# Patient Record
Sex: FEMALE | Race: WHITE | NOT HISPANIC OR LATINO | Employment: FULL TIME | URBAN - NONMETROPOLITAN AREA
[De-identification: names, ages, dates, MRNs, and addresses within clinical notes are randomized per-mention and may not be internally consistent; named-entity substitution may affect disease eponyms.]

---

## 2019-04-12 ENCOUNTER — CONVERSION ENCOUNTER (OUTPATIENT)
Dept: FAMILY MEDICINE CLINIC | Facility: CLINIC | Age: 39
End: 2019-04-12

## 2019-04-12 ENCOUNTER — OFFICE VISIT CONVERTED (OUTPATIENT)
Dept: FAMILY MEDICINE CLINIC | Facility: CLINIC | Age: 39
End: 2019-04-12
Attending: NURSE PRACTITIONER

## 2019-04-15 ENCOUNTER — HOSPITAL ENCOUNTER (OUTPATIENT)
Dept: GENERAL RADIOLOGY | Facility: HOSPITAL | Age: 39
Discharge: HOME | End: 2019-04-15
Attending: NURSE PRACTITIONER

## 2019-04-15 LAB
25(OH)D3 SERPL-MCNC: 35 NG/ML (ref 30–100)
ALBUMIN SERPL-MCNC: 4.6 G/DL (ref 3.5–5)
ALBUMIN/GLOB SERPL: 1.7 {RATIO} (ref 1.4–2.6)
ALP SERPL-CCNC: 63 U/L (ref 42–98)
ALT SERPL-CCNC: 30 U/L (ref 10–40)
ANION GAP SERPL CALC-SCNC: 16 MMOL/L (ref 8–19)
AST SERPL-CCNC: 21 U/L (ref 15–50)
BASOPHILS # BLD AUTO: 0.06 10*3/UL (ref 0–0.2)
BASOPHILS NFR BLD AUTO: 0.7 % (ref 0–3)
BILIRUB SERPL-MCNC: 0.26 MG/DL (ref 0.2–1.3)
BUN SERPL-MCNC: 12 MG/DL (ref 5–25)
BUN/CREAT SERPL: 16 {RATIO} (ref 6–20)
CALCIUM SERPL-MCNC: 9.1 MG/DL (ref 8.7–10.4)
CHLORIDE SERPL-SCNC: 103 MMOL/L (ref 99–111)
CHOLEST SERPL-MCNC: 174 MG/DL (ref 107–200)
CHOLEST/HDLC SERPL: 2.9 {RATIO} (ref 3–6)
CONV ABS IMM GRAN: 0.02 10*3/UL (ref 0–0.2)
CONV CO2: 25 MMOL/L (ref 22–32)
CONV IMMATURE GRAN: 0.2 % (ref 0–1.8)
CONV TOTAL PROTEIN: 7.3 G/DL (ref 6.3–8.2)
CREAT UR-MCNC: 0.75 MG/DL (ref 0.5–0.9)
DEPRECATED RDW RBC AUTO: 40.3 FL (ref 36.4–46.3)
EOSINOPHIL # BLD AUTO: 0.13 10*3/UL (ref 0–0.7)
EOSINOPHIL # BLD AUTO: 1.6 % (ref 0–7)
ERYTHROCYTE [DISTWIDTH] IN BLOOD BY AUTOMATED COUNT: 12.4 % (ref 11.7–14.4)
GFR SERPLBLD BASED ON 1.73 SQ M-ARVRAT: >60 ML/MIN/{1.73_M2}
GLOBULIN UR ELPH-MCNC: 2.7 G/DL (ref 2–3.5)
GLUCOSE SERPL-MCNC: 88 MG/DL (ref 65–99)
HBA1C MFR BLD: 13.1 G/DL (ref 12–16)
HCT VFR BLD AUTO: 41 % (ref 37–47)
HDLC SERPL-MCNC: 59 MG/DL (ref 40–60)
LDLC SERPL CALC-MCNC: 98 MG/DL (ref 70–100)
LYMPHOCYTES # BLD AUTO: 2.16 10*3/UL (ref 1–5)
MCH RBC QN AUTO: 28.4 PG (ref 27–31)
MCHC RBC AUTO-ENTMCNC: 32 G/DL (ref 33–37)
MCV RBC AUTO: 88.9 FL (ref 81–99)
MONOCYTES # BLD AUTO: 0.55 10*3/UL (ref 0.2–1.2)
MONOCYTES NFR BLD AUTO: 6.8 % (ref 3–10)
NEUTROPHILS # BLD AUTO: 5.11 10*3/UL (ref 2–8)
NEUTROPHILS NFR BLD AUTO: 63.8 % (ref 30–85)
NRBC CBCN: 0 % (ref 0–0.7)
OSMOLALITY SERPL CALC.SUM OF ELEC: 287 MOSM/KG (ref 273–304)
PLATELET # BLD AUTO: 265 10*3/UL (ref 130–400)
PMV BLD AUTO: 11.8 FL (ref 9.4–12.3)
POTASSIUM SERPL-SCNC: 4.5 MMOL/L (ref 3.5–5.3)
RBC # BLD AUTO: 4.61 10*6/UL (ref 4.2–5.4)
SODIUM SERPL-SCNC: 139 MMOL/L (ref 135–147)
TRIGL SERPL-MCNC: 86 MG/DL (ref 40–150)
TSH SERPL-ACNC: 2.95 M[IU]/L (ref 0.27–4.2)
VARIANT LYMPHS NFR BLD MANUAL: 26.9 % (ref 20–45)
VIT B12 SERPL-MCNC: 922 PG/ML (ref 211–911)
VLDLC SERPL-MCNC: 17 MG/DL (ref 5–37)
WBC # BLD AUTO: 8.03 10*3/UL (ref 4.8–10.8)

## 2020-01-03 ENCOUNTER — OFFICE VISIT CONVERTED (OUTPATIENT)
Dept: FAMILY MEDICINE CLINIC | Facility: CLINIC | Age: 40
End: 2020-01-03
Attending: NURSE PRACTITIONER

## 2021-05-15 VITALS
BODY MASS INDEX: 26.53 KG/M2 | TEMPERATURE: 97.5 F | HEART RATE: 72 BPM | DIASTOLIC BLOOD PRESSURE: 54 MMHG | RESPIRATION RATE: 16 BRPM | OXYGEN SATURATION: 99 % | SYSTOLIC BLOOD PRESSURE: 112 MMHG | WEIGHT: 159.25 LBS | HEIGHT: 65 IN

## 2021-05-15 VITALS
SYSTOLIC BLOOD PRESSURE: 115 MMHG | HEIGHT: 65 IN | TEMPERATURE: 97.2 F | WEIGHT: 180.25 LBS | BODY MASS INDEX: 30.03 KG/M2 | HEART RATE: 70 BPM | RESPIRATION RATE: 20 BRPM | DIASTOLIC BLOOD PRESSURE: 75 MMHG | OXYGEN SATURATION: 100 %

## 2021-10-08 ENCOUNTER — APPOINTMENT (OUTPATIENT)
Dept: WOMENS IMAGING | Facility: HOSPITAL | Age: 41
End: 2021-10-08

## 2021-10-08 PROCEDURE — 77063 BREAST TOMOSYNTHESIS BI: CPT | Performed by: RADIOLOGY

## 2021-10-08 PROCEDURE — 77067 SCR MAMMO BI INCL CAD: CPT | Performed by: RADIOLOGY

## 2022-09-23 ENCOUNTER — LAB (OUTPATIENT)
Dept: LAB | Facility: HOSPITAL | Age: 42
End: 2022-09-23

## 2022-09-23 ENCOUNTER — HOSPITAL ENCOUNTER (OUTPATIENT)
Dept: GENERAL RADIOLOGY | Facility: HOSPITAL | Age: 42
Discharge: HOME | End: 2022-09-23

## 2022-09-23 ENCOUNTER — OFFICE VISIT (OUTPATIENT)
Dept: FAMILY MEDICINE CLINIC | Facility: CLINIC | Age: 42
End: 2022-09-23

## 2022-09-23 VITALS
TEMPERATURE: 97.3 F | BODY MASS INDEX: 30.29 KG/M2 | HEIGHT: 65 IN | OXYGEN SATURATION: 100 % | SYSTOLIC BLOOD PRESSURE: 136 MMHG | HEART RATE: 80 BPM | DIASTOLIC BLOOD PRESSURE: 80 MMHG | WEIGHT: 181.8 LBS

## 2022-09-23 DIAGNOSIS — J30.9 ALLERGIC RHINITIS, UNSPECIFIED SEASONALITY, UNSPECIFIED TRIGGER: ICD-10-CM

## 2022-09-23 DIAGNOSIS — Z11.59 NEED FOR HEPATITIS C SCREENING TEST: ICD-10-CM

## 2022-09-23 DIAGNOSIS — R05.9 COUGH: ICD-10-CM

## 2022-09-23 DIAGNOSIS — Z13.220 SCREENING FOR LIPID DISORDERS: ICD-10-CM

## 2022-09-23 DIAGNOSIS — F32.A DEPRESSION, UNSPECIFIED DEPRESSION TYPE: ICD-10-CM

## 2022-09-23 DIAGNOSIS — Z11.59 NEED FOR HEPATITIS C SCREENING TEST: Primary | ICD-10-CM

## 2022-09-23 DIAGNOSIS — Z86.16 HISTORY OF COVID-19: ICD-10-CM

## 2022-09-23 DIAGNOSIS — Z13.29 SCREENING FOR THYROID DISORDER: ICD-10-CM

## 2022-09-23 LAB
ALBUMIN SERPL-MCNC: 4.5 G/DL (ref 3.5–5.2)
ALBUMIN/GLOB SERPL: 1.7 G/DL
ALP SERPL-CCNC: 70 U/L (ref 39–117)
ALT SERPL W P-5'-P-CCNC: 35 U/L (ref 1–33)
ANION GAP SERPL CALCULATED.3IONS-SCNC: 10.6 MMOL/L (ref 5–15)
AST SERPL-CCNC: 21 U/L (ref 1–32)
BASOPHILS # BLD AUTO: 0.08 10*3/MM3 (ref 0–0.2)
BASOPHILS NFR BLD AUTO: 0.7 % (ref 0–1.5)
BILIRUB SERPL-MCNC: 0.4 MG/DL (ref 0–1.2)
BUN SERPL-MCNC: 12 MG/DL (ref 6–20)
BUN/CREAT SERPL: 16.2 (ref 7–25)
CALCIUM SPEC-SCNC: 9 MG/DL (ref 8.6–10.5)
CHLORIDE SERPL-SCNC: 100 MMOL/L (ref 98–107)
CHOLEST SERPL-MCNC: 203 MG/DL (ref 0–200)
CO2 SERPL-SCNC: 27.4 MMOL/L (ref 22–29)
CREAT SERPL-MCNC: 0.74 MG/DL (ref 0.57–1)
DEPRECATED RDW RBC AUTO: 40.6 FL (ref 37–54)
EGFRCR SERPLBLD CKD-EPI 2021: 104.4 ML/MIN/1.73
EOSINOPHIL # BLD AUTO: 0.11 10*3/MM3 (ref 0–0.4)
EOSINOPHIL NFR BLD AUTO: 1 % (ref 0.3–6.2)
ERYTHROCYTE [DISTWIDTH] IN BLOOD BY AUTOMATED COUNT: 13.1 % (ref 12.3–15.4)
GLOBULIN UR ELPH-MCNC: 2.6 GM/DL
GLUCOSE SERPL-MCNC: 86 MG/DL (ref 65–99)
HCT VFR BLD AUTO: 40.7 % (ref 34–46.6)
HCV AB SER DONR QL: NORMAL
HDLC SERPL-MCNC: 50 MG/DL (ref 40–60)
HGB BLD-MCNC: 13.5 G/DL (ref 12–15.9)
IMM GRANULOCYTES # BLD AUTO: 0.07 10*3/MM3 (ref 0–0.05)
IMM GRANULOCYTES NFR BLD AUTO: 0.6 % (ref 0–0.5)
LDLC SERPL CALC-MCNC: 117 MG/DL (ref 0–100)
LDLC/HDLC SERPL: 2.24 {RATIO}
LYMPHOCYTES # BLD AUTO: 3.41 10*3/MM3 (ref 0.7–3.1)
LYMPHOCYTES NFR BLD AUTO: 31 % (ref 19.6–45.3)
MCH RBC QN AUTO: 28.3 PG (ref 26.6–33)
MCHC RBC AUTO-ENTMCNC: 33.2 G/DL (ref 31.5–35.7)
MCV RBC AUTO: 85.3 FL (ref 79–97)
MONOCYTES # BLD AUTO: 0.83 10*3/MM3 (ref 0.1–0.9)
MONOCYTES NFR BLD AUTO: 7.5 % (ref 5–12)
NEUTROPHILS NFR BLD AUTO: 59.2 % (ref 42.7–76)
NEUTROPHILS NFR BLD AUTO: 6.5 10*3/MM3 (ref 1.7–7)
NRBC BLD AUTO-RTO: 0 /100 WBC (ref 0–0.2)
PLATELET # BLD AUTO: 298 10*3/MM3 (ref 140–450)
PMV BLD AUTO: 11.7 FL (ref 6–12)
POTASSIUM SERPL-SCNC: 4.2 MMOL/L (ref 3.5–5.2)
PROT SERPL-MCNC: 7.1 G/DL (ref 6–8.5)
RBC # BLD AUTO: 4.77 10*6/MM3 (ref 3.77–5.28)
SODIUM SERPL-SCNC: 138 MMOL/L (ref 136–145)
TRIGL SERPL-MCNC: 204 MG/DL (ref 0–150)
TSH SERPL DL<=0.05 MIU/L-ACNC: 1.75 UIU/ML (ref 0.27–4.2)
VLDLC SERPL-MCNC: 36 MG/DL (ref 5–40)
WBC NRBC COR # BLD: 11 10*3/MM3 (ref 3.4–10.8)

## 2022-09-23 PROCEDURE — 80061 LIPID PANEL: CPT

## 2022-09-23 PROCEDURE — 86803 HEPATITIS C AB TEST: CPT

## 2022-09-23 PROCEDURE — 80053 COMPREHEN METABOLIC PANEL: CPT

## 2022-09-23 PROCEDURE — 99214 OFFICE O/P EST MOD 30 MIN: CPT | Performed by: NURSE PRACTITIONER

## 2022-09-23 PROCEDURE — 71046 X-RAY EXAM CHEST 2 VIEWS: CPT

## 2022-09-23 PROCEDURE — 85025 COMPLETE CBC W/AUTO DIFF WBC: CPT

## 2022-09-23 PROCEDURE — 36415 COLL VENOUS BLD VENIPUNCTURE: CPT

## 2022-09-23 PROCEDURE — 84443 ASSAY THYROID STIM HORMONE: CPT

## 2022-09-23 NOTE — PROGRESS NOTES
"Chief Complaint  Cough (Pt had cough for two weeks and cannot get rid of it.)    Subjective        Genny Garvey presents to [facility]  History of Present Illness  Pt presents with dry cough x 2 weeks. States she had covid in July and had residual cough for a few months before resolving. Pt with nasal congestion, fever, cough 2 weeks ago, then everything but cough resolved after 1 day. Did telehealth visit last week, given tesalon perles and medrol dose pack, which didn't provide relief. Went to  on Saturday, given steroid injection and zpack. States cough seems a little better today for the first time. Taking antihistamine at night and albuterol inhaler as needed. Denies current fever, chills, SOB, nasal congestion, HA, N/V/D, sore throat, or other. Took at home covid test which was negative. Pt denies any history of reflux, but states she has tried prilosec the last few days.     Reviewed all recent labs and medications.      Objective   Vital Signs:  /80   Pulse 80   Temp 97.3 °F (36.3 °C) (Temporal)   Ht 165.1 cm (65\")   Wt 82.5 kg (181 lb 12.8 oz)   SpO2 100%   BMI 30.25 kg/m²   Estimated body mass index is 30.25 kg/m² as calculated from the following:    Height as of this encounter: 165.1 cm (65\").    Weight as of this encounter: 82.5 kg (181 lb 12.8 oz).          Physical Exam  Vitals reviewed.   Constitutional:       General: She is not in acute distress.     Appearance: Normal appearance.   HENT:      Head: Normocephalic.      Right Ear: Tympanic membrane normal.      Left Ear: Tympanic membrane normal.      Nose: Nose normal.      Mouth/Throat:      Pharynx: Oropharynx is clear. No posterior oropharyngeal erythema.   Eyes:      General: No scleral icterus.     Extraocular Movements: Extraocular movements intact.      Conjunctiva/sclera: Conjunctivae normal.      Pupils: Pupils are equal, round, and reactive to light.   Cardiovascular:      Rate and " Rhythm: Normal rate and regular rhythm.      Pulses: Normal pulses.      Heart sounds: Normal heart sounds.   Pulmonary:      Effort: Pulmonary effort is normal.      Breath sounds: Normal breath sounds.      Comments: Dry cough during appt   Abdominal:      General: Bowel sounds are normal.      Palpations: Abdomen is soft.   Musculoskeletal:         General: Normal range of motion.      Cervical back: Neck supple.   Skin:     General: Skin is warm and dry.   Neurological:      Mental Status: She is alert and oriented to person, place, and time.   Psychiatric:         Mood and Affect: Mood normal.         Behavior: Behavior normal.         Thought Content: Thought content normal.         Judgment: Judgment normal.        Result Review :                  Assessment and Plan   Diagnoses and all orders for this visit:    1. Need for hepatitis C screening test (Primary)  -     Hepatitis C Antibody; Future    2. Cough  Comments:  Order for CXR/labs   Continue zpack as prescribed   Given sample of Breo 200mcg 1 puff qd   Notify if refill is needed   Orders:  -     Comprehensive Metabolic Panel; Future  -     CBC & Differential; Future  -     XR Chest PA & Lateral; Future    3. History of COVID-19    4. Allergic rhinitis, unspecified seasonality, unspecified trigger  Assessment & Plan:  Continue zyzal as prescribed   Avoid allergy triggers       5. Depression, unspecified depression type  Assessment & Plan:  Patient's depression is recurrent and is mild without psychosis. Their depression is currently active and the condition is improving with treatment. This will be reassessed at the next regular appointment. F/U as described:patient will continue current medication therapy.      6. Screening for thyroid disorder  -     TSH; Future    7. Screening for lipid disorders  -     Lipid Panel; Future           Follow Up   Return if symptoms worsen or fail to improve.  Patient was given instructions and counseling regarding her  condition or for health maintenance advice. Please see specific information pulled into the AVS if appropriate.

## 2022-09-30 ENCOUNTER — TELEPHONE (OUTPATIENT)
Dept: FAMILY MEDICINE CLINIC | Facility: CLINIC | Age: 42
End: 2022-09-30

## 2022-10-04 ENCOUNTER — TELEPHONE (OUTPATIENT)
Dept: FAMILY MEDICINE CLINIC | Facility: CLINIC | Age: 42
End: 2022-10-04

## 2022-10-04 NOTE — TELEPHONE ENCOUNTER
Caller: Genny Garvey    Relationship: Self    Best call back number: [phone]    What medication are you requesting: JOSSELIN VASQUEZ     What are your current symptoms: SLIGHT LINGERING COUGH    How long have you been experiencing symptoms: ABOUT A MONTH    Have you had these symptoms before:    [x] Yes  [] No    Have you been treated for these symptoms before:   [x] Yes  [] No    If a prescription is needed, what is your preferred pharmacy and phone number:      [id number] [address] [place] AT [facility] [address] - [phone] [facility] [phone]   [phone]

## 2022-10-14 ENCOUNTER — APPOINTMENT (OUTPATIENT)
Dept: WOMENS IMAGING | Facility: HOSPITAL | Age: 42
End: 2022-10-14

## 2022-10-14 PROCEDURE — 77063 BREAST TOMOSYNTHESIS BI: CPT | Performed by: RADIOLOGY

## 2022-10-14 PROCEDURE — 77067 SCR MAMMO BI INCL CAD: CPT | Performed by: RADIOLOGY

## 2025-01-28 ENCOUNTER — OFFICE VISIT (OUTPATIENT)
Dept: FAMILY MEDICINE CLINIC | Facility: CLINIC | Age: 45
End: 2025-01-28
Payer: COMMERCIAL

## 2025-01-28 VITALS
DIASTOLIC BLOOD PRESSURE: 70 MMHG | TEMPERATURE: 97.4 F | OXYGEN SATURATION: 100 % | SYSTOLIC BLOOD PRESSURE: 117 MMHG | BODY MASS INDEX: 27.36 KG/M2 | HEART RATE: 67 BPM | HEIGHT: 65 IN | WEIGHT: 164.2 LBS

## 2025-01-28 DIAGNOSIS — Z13.21 ENCOUNTER FOR VITAMIN DEFICIENCY SCREENING: ICD-10-CM

## 2025-01-28 DIAGNOSIS — Z00.00 ANNUAL PHYSICAL EXAM: Primary | ICD-10-CM

## 2025-01-28 DIAGNOSIS — E55.9 VITAMIN D DEFICIENCY: ICD-10-CM

## 2025-01-28 DIAGNOSIS — E66.3 OVERWEIGHT (BMI 25.0-29.9): ICD-10-CM

## 2025-01-28 LAB
25(OH)D3 SERPL-MCNC: 39.7 NG/ML (ref 30–100)
ALBUMIN SERPL-MCNC: 4 G/DL (ref 3.5–5.2)
ALBUMIN/GLOB SERPL: 1.4 G/DL
ALP SERPL-CCNC: 53 U/L (ref 39–117)
ALT SERPL W P-5'-P-CCNC: 53 U/L (ref 1–33)
ANION GAP SERPL CALCULATED.3IONS-SCNC: 9.3 MMOL/L (ref 5–15)
AST SERPL-CCNC: 40 U/L (ref 1–32)
BACTERIA UR QL AUTO: ABNORMAL /HPF
BASOPHILS # BLD AUTO: 0.06 10*3/MM3 (ref 0–0.2)
BASOPHILS NFR BLD AUTO: 0.8 % (ref 0–1.5)
BILIRUB SERPL-MCNC: 0.3 MG/DL (ref 0–1.2)
BILIRUB UR QL STRIP: NEGATIVE
BUN SERPL-MCNC: 10 MG/DL (ref 6–20)
BUN/CREAT SERPL: 13.7 (ref 7–25)
CALCIUM SPEC-SCNC: 8.9 MG/DL (ref 8.6–10.5)
CHLORIDE SERPL-SCNC: 102 MMOL/L (ref 98–107)
CHOLEST SERPL-MCNC: 178 MG/DL (ref 0–200)
CLARITY UR: ABNORMAL
CO2 SERPL-SCNC: 26.7 MMOL/L (ref 22–29)
COLOR UR: YELLOW
CREAT SERPL-MCNC: 0.73 MG/DL (ref 0.57–1)
DEPRECATED RDW RBC AUTO: 38.1 FL (ref 37–54)
EGFRCR SERPLBLD CKD-EPI 2021: 104.1 ML/MIN/1.73
EOSINOPHIL # BLD AUTO: 0.06 10*3/MM3 (ref 0–0.4)
EOSINOPHIL NFR BLD AUTO: 0.8 % (ref 0.3–6.2)
ERYTHROCYTE [DISTWIDTH] IN BLOOD BY AUTOMATED COUNT: 11.9 % (ref 12.3–15.4)
FOLATE SERPL-MCNC: 11.2 NG/ML (ref 4.78–24.2)
GLOBULIN UR ELPH-MCNC: 2.8 GM/DL
GLUCOSE SERPL-MCNC: 85 MG/DL (ref 65–99)
GLUCOSE UR STRIP-MCNC: NEGATIVE MG/DL
HBA1C MFR BLD: 4.9 % (ref 4.8–5.6)
HCT VFR BLD AUTO: 37.8 % (ref 34–46.6)
HDLC SERPL-MCNC: 51 MG/DL (ref 40–60)
HGB BLD-MCNC: 12.9 G/DL (ref 12–15.9)
HGB UR QL STRIP.AUTO: NEGATIVE
HYALINE CASTS UR QL AUTO: ABNORMAL /LPF
IMM GRANULOCYTES # BLD AUTO: 0.02 10*3/MM3 (ref 0–0.05)
IMM GRANULOCYTES NFR BLD AUTO: 0.3 % (ref 0–0.5)
KETONES UR QL STRIP: NEGATIVE
LDLC SERPL CALC-MCNC: 90 MG/DL (ref 0–100)
LDLC/HDLC SERPL: 1.63 {RATIO}
LEUKOCYTE ESTERASE UR QL STRIP.AUTO: NEGATIVE
LYMPHOCYTES # BLD AUTO: 2.32 10*3/MM3 (ref 0.7–3.1)
LYMPHOCYTES NFR BLD AUTO: 32.1 % (ref 19.6–45.3)
MCH RBC QN AUTO: 29.7 PG (ref 26.6–33)
MCHC RBC AUTO-ENTMCNC: 34.1 G/DL (ref 31.5–35.7)
MCV RBC AUTO: 86.9 FL (ref 79–97)
MONOCYTES # BLD AUTO: 0.52 10*3/MM3 (ref 0.1–0.9)
MONOCYTES NFR BLD AUTO: 7.2 % (ref 5–12)
NEUTROPHILS NFR BLD AUTO: 4.24 10*3/MM3 (ref 1.7–7)
NEUTROPHILS NFR BLD AUTO: 58.8 % (ref 42.7–76)
NITRITE UR QL STRIP: NEGATIVE
NRBC BLD AUTO-RTO: 0 /100 WBC (ref 0–0.2)
PH UR STRIP.AUTO: 6.5 [PH] (ref 5–8)
PLATELET # BLD AUTO: 298 10*3/MM3 (ref 140–450)
PMV BLD AUTO: 11.4 FL (ref 6–12)
POTASSIUM SERPL-SCNC: 3.8 MMOL/L (ref 3.5–5.2)
PROT SERPL-MCNC: 6.8 G/DL (ref 6–8.5)
PROT UR QL STRIP: ABNORMAL
RBC # BLD AUTO: 4.35 10*6/MM3 (ref 3.77–5.28)
RBC # UR STRIP: ABNORMAL /HPF
REF LAB TEST METHOD: ABNORMAL
SODIUM SERPL-SCNC: 138 MMOL/L (ref 136–145)
SP GR UR STRIP: 1.03 (ref 1–1.03)
SQUAMOUS #/AREA URNS HPF: ABNORMAL /HPF
TRIGL SERPL-MCNC: 219 MG/DL (ref 0–150)
TSH SERPL DL<=0.05 MIU/L-ACNC: 2.11 UIU/ML (ref 0.27–4.2)
UROBILINOGEN UR QL STRIP: ABNORMAL
VIT B12 BLD-MCNC: 517 PG/ML (ref 211–946)
VLDLC SERPL-MCNC: 37 MG/DL (ref 5–40)
WBC # UR STRIP: ABNORMAL /HPF
WBC NRBC COR # BLD AUTO: 7.22 10*3/MM3 (ref 3.4–10.8)
YEAST URNS QL MICRO: PRESENT /HPF

## 2025-01-28 PROCEDURE — 84443 ASSAY THYROID STIM HORMONE: CPT | Performed by: STUDENT IN AN ORGANIZED HEALTH CARE EDUCATION/TRAINING PROGRAM

## 2025-01-28 PROCEDURE — 82306 VITAMIN D 25 HYDROXY: CPT | Performed by: STUDENT IN AN ORGANIZED HEALTH CARE EDUCATION/TRAINING PROGRAM

## 2025-01-28 PROCEDURE — 99213 OFFICE O/P EST LOW 20 MIN: CPT | Performed by: STUDENT IN AN ORGANIZED HEALTH CARE EDUCATION/TRAINING PROGRAM

## 2025-01-28 PROCEDURE — 36415 COLL VENOUS BLD VENIPUNCTURE: CPT | Performed by: STUDENT IN AN ORGANIZED HEALTH CARE EDUCATION/TRAINING PROGRAM

## 2025-01-28 PROCEDURE — 99396 PREV VISIT EST AGE 40-64: CPT | Performed by: STUDENT IN AN ORGANIZED HEALTH CARE EDUCATION/TRAINING PROGRAM

## 2025-01-28 PROCEDURE — 82746 ASSAY OF FOLIC ACID SERUM: CPT | Performed by: STUDENT IN AN ORGANIZED HEALTH CARE EDUCATION/TRAINING PROGRAM

## 2025-01-28 PROCEDURE — 80053 COMPREHEN METABOLIC PANEL: CPT | Performed by: STUDENT IN AN ORGANIZED HEALTH CARE EDUCATION/TRAINING PROGRAM

## 2025-01-28 PROCEDURE — 80061 LIPID PANEL: CPT | Performed by: STUDENT IN AN ORGANIZED HEALTH CARE EDUCATION/TRAINING PROGRAM

## 2025-01-28 PROCEDURE — 82607 VITAMIN B-12: CPT | Performed by: STUDENT IN AN ORGANIZED HEALTH CARE EDUCATION/TRAINING PROGRAM

## 2025-01-28 PROCEDURE — 81001 URINALYSIS AUTO W/SCOPE: CPT | Performed by: STUDENT IN AN ORGANIZED HEALTH CARE EDUCATION/TRAINING PROGRAM

## 2025-01-28 PROCEDURE — 83036 HEMOGLOBIN GLYCOSYLATED A1C: CPT | Performed by: STUDENT IN AN ORGANIZED HEALTH CARE EDUCATION/TRAINING PROGRAM

## 2025-01-28 PROCEDURE — 85025 COMPLETE CBC W/AUTO DIFF WBC: CPT | Performed by: STUDENT IN AN ORGANIZED HEALTH CARE EDUCATION/TRAINING PROGRAM

## 2025-01-28 RX ORDER — FLUOXETINE 10 MG/1
TABLET ORAL
COMMUNITY
Start: 2010-12-01

## 2025-01-28 RX ORDER — ESTRADIOL 2 MG/1
2 TABLET ORAL DAILY
COMMUNITY
Start: 2025-01-04

## 2025-01-28 RX ORDER — VALACYCLOVIR HYDROCHLORIDE 1 G/1
TABLET, FILM COATED ORAL
COMMUNITY
Start: 2024-12-13

## 2025-01-28 RX ORDER — LORATADINE 10 MG/1
CAPSULE, LIQUID FILLED ORAL
COMMUNITY

## 2025-01-28 NOTE — PROGRESS NOTES
Adult Female Preventive Health Visit  DOS: 25    Patient: Genny Garvey  :  1980  Age: 44 y.o.  Gender: female   MRN: [id number]    Chief Complaint:   Annual Health Maintenance  Establish Care    Subjective   Patient is here for annual physical. Other complaints are overweight    History of Present Illness  The patient presents to establish care.    She is here for an annual checkup and wishes to have her blood work, cholesterol, vitamin levels, and hormones checked. She has a history of vitamin D and B deficiencies but has not been on any specific treatment for these conditions recently. She occasionally takes a multivitamin. She experienced a gastrointestinal upset on Saturday, characterized by persistent vomiting, but has been symptom-free for the past two days. She suspects she may still be dehydrated as she has been unable to consume adequate water. She has not consumed any food today.    She has been using semaglutide injections for weight management, prescribed by her gynecologist, and has lost more than 11 pounds since last year. She is currently on a regimen of 1 mg/week solution weekly, obtained from a compounding pharmacy, and has been advised that she can increase the dosage up to 1.7 mg/wk if necessary.       Allergies:   No Known Allergies  Medications:  Current Outpatient Medications on File Prior to Visit   Medication Sig Dispense Refill    estradiol (ESTRACE) 2 MG tablet Take 1 tablet by mouth Daily.      FLUoxetine HCl, PMDD, 10 MG tablet       Levonorgestrel (MIRENA) 20 MCG/DAY intrauterine device IUD       Loratadine 10 MG capsule Take  by mouth.      valACYclovir (VALTREX) 1000 MG tablet TAKE 2 TABLETS AS NEEDED      SEMAGLUTIDE PO Inject 1 mg under the skin into the appropriate area as directed 1 (One) Time Per Week. 20 units weekly      [DISCONTINUED] albuterol sulfate  (90 Base) MCG/ACT inhaler Inhale 2 puffs Every 6 (Six) Hours As Needed for Wheezing or Shortness of  Air (bronchospastic cough). 6.7 g 0    [DISCONTINUED] ergocalciferol (ERGOCALCIFEROL) 1.25 MG (53467 UT) capsule ergocalciferol (vitamin D2) 50,000 unit oral capsule take 1 capsule (50,000 unit) by oral route once weekly for 6 weeks   Suspended      [DISCONTINUED] FLUoxetine (PROzac) 20 MG tablet fluoxetine 20 mg oral tablet take 1 tablet (20 mg) by oral route once daily in the morning   Active      [DISCONTINUED] Fluticasone Furoate-Vilanterol (Breo Ellipta) 200-25 MCG/INH inhaler Inhale 1 puff Daily. 1 each 5    [DISCONTINUED] levocetirizine (XYZAL) 5 MG tablet Take 5 mg by mouth Every Evening.      [DISCONTINUED] Loratadine-Pseudoephedrine (CLARITIN-D 24 HOUR PO) Take  by mouth.       No current facility-administered medications on file prior to visit.      I have reviewed and updated as appropriate the past medical, surgical, family, and social history as summarized below:  Past Medical, Social and Family History:     Past Medical History:   Diagnosis Date    Anxiety 2010    take fluoxetine for anxiety     Social History     Tobacco Use    Smoking status: Never    Smokeless tobacco: Never   Substance Use Topics    Alcohol use: Yes     Alcohol/week: 4.0 standard drinks of alcohol     Types: 2 Cans of beer, 2 Drinks containing 0.5 oz of alcohol per week     Comment: socially     Family History   Problem Relation Age of Onset    Cancer Mother         Multiple Myeloma    Diabetes Mother         Type 2 - diagnosed in 50s    Hyperlipidemia Mother     Cancer Father         prostate cancer    Hyperlipidemia Father     Stroke Father     Diabetes Maternal Grandmother         Type 2    Cancer Paternal Grandfather         lung cancer    Cancer Paternal Grandmother         lung cancer    Cancer Maternal Aunt     Diabetes Brother         Type 2 - diagnosed at 45     Immunization History   Administered Date(s) Administered    COVID-19 (MODERNA) 1st,2nd,3rd Dose Monovalent 03/21/2021, 04/18/2021    COVID-19 (MODERNA) Monovalent  "Original Booster 11/22/2021    Fluzone  >6mos 11/17/2016, 11/08/2024    Fluzone (or Fluarix & Flulaval for VFC) >6mos 10/26/2020    Influenza Injectable Mdck Pf Quad 10/15/2022, 11/28/2023    Influenza TIV (IM) 11/01/2021    Influenza, Unspecified 11/01/2023    Tdap 07/20/2020, 07/01/2024       The following data was reviewed by: Roxanne Mcwilliams MD on 01/28/2025:           Objective   Vital Signs:   Vitals:    01/28/25 0829   BP: 117/70   BP Location: Left arm   Patient Position: Sitting   Cuff Size: Adult   Pulse: 67   Temp: 97.4 °F (36.3 °C)   TempSrc: Temporal   SpO2: 100%   Weight: 74.5 kg (164 lb 3.2 oz)   Height: 165.1 cm (65\")     Body mass index is 27.32 kg/m².    Wt Readings from Last 3 Encounters:   01/28/25 74.5 kg (164 lb 3.2 oz)   09/29/24 79.5 kg (175 lb 3.2 oz)   09/23/22 82.5 kg (181 lb 12.8 oz)     BP Readings from Last 3 Encounters:   01/28/25 117/70   09/29/24 130/78   09/23/22 136/80       Review of Systems  Per HPI    Physical Exam  Constitutional:       Appearance: Normal appearance.   HENT:      Head: Normocephalic and atraumatic.      Mouth/Throat:      Mouth: Mucous membranes are moist.   Eyes:      Pupils: Pupils are equal, round, and reactive to light.   Cardiovascular:      Rate and Rhythm: Normal rate and regular rhythm.      Pulses: Normal pulses.      Heart sounds: Normal heart sounds.   Pulmonary:      Effort: Pulmonary effort is normal.      Breath sounds: Normal breath sounds.   Abdominal:      Palpations: Abdomen is soft.   Neurological:      General: No focal deficit present.      Mental Status: She is alert and oriented to person, place, and time.   Psychiatric:         Mood and Affect: Mood normal.         Behavior: Behavior normal.         Physical Exam  There is a little bit of fluid behind the eardrum. No infection noted.  Lungs were auscultated.  Abdominal exam was performed.    Vital Signs  Blood pressure is normal. Weight is slightly elevated.       Health Maintenance "   Topic Date Due    COVID-19 Vaccine (4 - 2024-25 season) 01/30/2025 (Originally 9/1/2024)    ANNUAL PHYSICAL  01/28/2026    BMI FOLLOWUP  01/28/2026    MAMMOGRAM  12/18/2026    PAP SMEAR  12/18/2027    TDAP/TD VACCINES (3 - Td or Tdap) 07/01/2034    HEPATITIS C SCREENING  Completed    INFLUENZA VACCINE  Completed    Pneumococcal Vaccine 0-64  Aged Out       Lifestyle:  Marital Status:   Household members: spouse   Work Status: employed  Weight Concerns: yes  Balanced diet: Yes  Regular Exercise: Yes           Fall Risk  STEADI Fall Risk Assessment has not been completed.    Social Screening Questions:  Genny Garvey  reports that she has never smoked. She has never used smokeless tobacco.        Drug Use: denied.  Alcohol Use: none  Sexually Active: Yes  Sexual Preference: heterosexual  Contraception:  IUD    PHQ-9 Depression Screening  Little interest or pleasure in doing things? Not at all   Feeling down, depressed, or hopeless? Not at all   PHQ-2 Total Score 0   Trouble falling or staying asleep, or sleeping too much?     Feeling tired or having little energy?     Poor appetite or overeating?     Feeling bad about yourself - or that you are a failure or have let yourself or your family down?     Trouble concentrating on things, such as reading the newspaper or watching television?     Moving or speaking so slowly that other people could have noticed? Or the opposite - being so fidgety or restless that you have been moving around a lot more than usual?     Thoughts that you would be better off dead, or of hurting yourself in some way?     PHQ-9 Total Score     If you checked off any problems, how difficult have these problems made it for you to do your work, take care of things at home, or get along with other people? Not difficult at all       Vision/Hearing/Dental  Regular Dental Visits: Yes  Vision Problems: No  Hearing Loss: No    Health Screening  Breast Cancer Screening:   Last Completed  Mammogram            MAMMOGRAM (Every 2 Years) Next due on 12/18/2026 12/18/2024  Done                  Pap Smear:   Last Completed Pap Smear            PAP SMEAR (Every 3 Years) Next due on 12/18/2027 12/18/2024  Done                  Colon Cancer Screening:   Last Completed Colonoscopy       This patient has no relevant Health Maintenance data.          Breast cancer screening: Up-to-date  Pap smear: Up-to-date  Colon Screening Methold: N/A  Last DEXA: Not applicable  Lung Cancer Screening: Not applicable      Safety  Smoke Detectors in Home: Yes  Carbon Monoxide Detectors in Home: Yes  Seatbelt use: Yes  Sunscreen Use: Yes    Results         Assessment   Diagnoses and all orders for this visit:    1. Annual physical exam (Primary)  -     CBC & Differential; Future  -     Comprehensive Metabolic Panel  -     Hemoglobin A1c; Future  -     Lipid Panel  -     TSH Rfx On Abnormal To Free T4  -     Urinalysis With Microscopic - Urine, Clean Catch  -     CBC & Differential  -     Hemoglobin A1c    2. Vitamin D deficiency  -     Vitamin D,25-Hydroxy    3. Encounter for vitamin deficiency screening  -     Vitamin B12  -     Folate; Future  -     Folate    4. Overweight (BMI 25.0-29.9)        Assessment & Plan  1. Health maintenance.  Her blood pressure readings are within the normal range. She falls into the overweight category but has achieved a weight loss of approximately 11 pounds since the previous year. Her Pap smear and mammogram results from December 2024 were satisfactory. She received a tetanus vaccine in 2024, which will be due again in 2034. She has also completed her influenza and hepatitis C vaccinations. A comprehensive panel of laboratory tests will be conducted today, including thyroid hormone, cholesterol levels, kidney function, liver function, hemoglobin, CMP, diabetic test, vitamin B12, folic acid, and vitamin D.     2. Weight management.  She is currently taking semaglutide 20 units of a  5 mg/mL solution weekly, prescribed by her gynecologist. She gets it from a compounding pharmacy.    Follow-up  The patient is scheduled for a follow-up visit in 6 months to monitor her weight loss progress.          Return in about 6 months (around 7/28/2025).    Patient Care Team:  Roxanne Mcwilliams MD as PCP - General (Family Medicine)  Tricia Mayo MD as Consulting Physician (Obstetrics and Gynecology)    Electronically signed by Roxanne Mcwilliams MD, 01/28/25, 8:47 AM EST.    Patient or patient representative verbalized consent for the use of Ambient Listening during the visit with  Roxanne Mcwilliams MD for chart documentation. 1/28/2025  09:44 EST

## 2025-02-03 ENCOUNTER — PATIENT ROUNDING (BHMG ONLY) (OUTPATIENT)
Dept: FAMILY MEDICINE CLINIC | Facility: CLINIC | Age: 45
End: 2025-02-03
Payer: COMMERCIAL

## 2025-02-03 NOTE — PROGRESS NOTES
A [facility] MESSAGE HAS BEEN SENT TO THE PATIENT FOR PATIENT ROUNDING WITH [facility]

## 2025-05-21 ENCOUNTER — OFFICE VISIT (OUTPATIENT)
Dept: FAMILY MEDICINE CLINIC | Facility: CLINIC | Age: 45
End: 2025-05-21
Payer: COMMERCIAL

## 2025-05-21 VITALS
SYSTOLIC BLOOD PRESSURE: 115 MMHG | DIASTOLIC BLOOD PRESSURE: 81 MMHG | TEMPERATURE: 97.6 F | WEIGHT: 154.6 LBS | OXYGEN SATURATION: 98 % | BODY MASS INDEX: 25.76 KG/M2 | HEIGHT: 65 IN | HEART RATE: 71 BPM

## 2025-05-21 DIAGNOSIS — E66.3 OVERWEIGHT (BMI 25.0-29.9): ICD-10-CM

## 2025-05-21 DIAGNOSIS — Z09 FOLLOW-UP EXAM: Primary | ICD-10-CM

## 2025-05-21 RX ORDER — FLUOXETINE 10 MG/1
10 CAPSULE ORAL DAILY
COMMUNITY
Start: 2025-04-01

## 2025-05-21 NOTE — PROGRESS NOTES
"Chief Complaint  Weight Management and Results (Lab result f/u)    Subjective      Genny Garvey is a 44 y.o. female who presents to [facility]     History of Present Illness  The patient presents for f/u and discuss lab work    She reports a general sense of well-being, with no specific concerns to address at this time. Her appetite has decreased since starting semaglutide, her current BMI is 25.7. She also notes an increase in energy levels compared to her previous state.    No symptoms of depression or anxiety         Patient Care Team:  Roxanne Mcwilliams MD as PCP - General (Family Medicine)  Tricia Mayo MD as Consulting Physician (Obstetrics and Gynecology)    Review of Systems      Objective   Vital Signs:   Vitals:    05/21/25 1128   BP: 115/81   BP Location: Left arm   Patient Position: Sitting   Cuff Size: Adult   Pulse: 71   Temp: 97.6 °F (36.4 °C)   TempSrc: Temporal   SpO2: 98%   Weight: 70.1 kg (154 lb 9.6 oz)   Height: 165.1 cm (65\")     Body mass index is 25.73 kg/m².    Wt Readings from Last 3 Encounters:   05/21/25 70.1 kg (154 lb 9.6 oz)   01/28/25 74.5 kg (164 lb 3.2 oz)   09/29/24 79.5 kg (175 lb 3.2 oz)     BP Readings from Last 3 Encounters:   05/21/25 115/81   01/28/25 117/70   09/29/24 130/78       Health Maintenance   Topic Date Due    COVID-19 Vaccine (4 - 2024-25 season) 06/04/2025 (Originally 9/1/2024)    INFLUENZA VACCINE  07/01/2025    ANNUAL PHYSICAL  01/28/2026    MAMMOGRAM  12/18/2026    PAP SMEAR  12/18/2027    TDAP/TD VACCINES (3 - Td or Tdap) 07/01/2034    HEPATITIS C SCREENING  Completed    Pneumococcal Vaccine 0-49  Aged Out       Physical Exam  Constitutional:       Appearance: Normal appearance.   HENT:      Head: Normocephalic and atraumatic.   Cardiovascular:      Rate and Rhythm: Normal rate and regular rhythm.      Pulses: Normal pulses.      Heart sounds: Normal heart sounds.   Pulmonary:      Effort: Pulmonary effort is normal.    "   Breath sounds: Normal breath sounds.   Neurological:      General: No focal deficit present.      Mental Status: She is alert and oriented to person, place, and time.   Psychiatric:         Mood and Affect: Mood normal.         Behavior: Behavior normal.       Physical Exam  Respiratory: Clear to auscultation, no wheezing, rales or rhonchi  Gastrointestinal: Soft, no tenderness, no distention, no masses       Result Review   The following data was reviewed by: Roxanne Mcwilliams MD on 05/21/2025:  [x]  Tests & Results  []  Hospitalization/Emergency Department/Urgent Care  []  Internal/External Consultant Notes      Results  Labs   - Metabolic Panel: 01/2025, Slightly elevated liver enzymes   - Triglycerides: 01/2025, High   - LDL and Total Cholesterol: 01/2025, Improved   - Vitamin D, Vitamin B12, Folic Acid: 01/2025, All fine   - Complete Blood Count: 01/2025, Hemoglobin and other blood cells, everything looked okay   - Urinalysis: 01/2025, Fine    Diagnostic Testing   - Pap Smear: 12/2024, Normal       ASSESSMENT/PLAN  Diagnoses and all orders for this visit:    1. Follow-up exam (Primary)    2. Overweight (BMI 25.0-29.9)        Assessment & Plan  1. Weight management.  - Weight loss of 10 pounds since 01/2025, BMI now borderline overweight, nearing normal.  - Currently on compounding semaglutide, taking approximately half of a 68 mL bottle, exact concentration unknown.  - Recommended gradual reduction in semaglutide dosage to assess body's response, with the goal of discontinuing the medication. Cautioned against excessive weight loss    2. Elevated liver enzymes.  - Slightly elevated liver enzymes noted  - Reports no risk factors such as unprotected sexual intercourse or right upper quadrant pain.  - Commended dietary modifications, daily exercise and weight management.  I will recheck liver enzymes in next visit     Follow-up  - Follow-up scheduled in 6 months.         FOLLOW UP  Return in about 6 months (around  11/21/2025).  Patient was given instructions and counseling regarding her condition or for health maintenance advice. Please see specific information pulled into the AVS if appropriate.       Roxanne Mcwilliams MD  05/21/25  13:16 EDT    Patient or patient representative verbalized consent for the use of Ambient Listening during the visit with  Roxanne Mcwilliams MD for chart documentation. 5/21/2025  13:16 EDT        Answers submitted by the patient for this visit:  Problem not listed (Submitted on 5/14/2025)  Chief Complaint: Other medical problem  Reason for appointment: Follow up visit  anorexia: No  joint pain: No  change in stool: No  joint swelling: No  swollen glands: No  vertigo: No  visual change: No  Onset: at an unknown time

## 2025-05-27 ENCOUNTER — OFFICE VISIT (OUTPATIENT)
Dept: FAMILY MEDICINE CLINIC | Facility: CLINIC | Age: 45
End: 2025-05-27
Payer: COMMERCIAL

## 2025-05-27 VITALS
SYSTOLIC BLOOD PRESSURE: 142 MMHG | DIASTOLIC BLOOD PRESSURE: 88 MMHG | TEMPERATURE: 97.6 F | HEART RATE: 81 BPM | BODY MASS INDEX: 25.86 KG/M2 | HEIGHT: 65 IN | OXYGEN SATURATION: 99 % | WEIGHT: 155.2 LBS

## 2025-05-27 DIAGNOSIS — L23.9 ALLERGIC DERMATITIS: ICD-10-CM

## 2025-05-27 DIAGNOSIS — R21 SKIN RASH: Primary | ICD-10-CM

## 2025-05-27 PROCEDURE — 99214 OFFICE O/P EST MOD 30 MIN: CPT | Performed by: STUDENT IN AN ORGANIZED HEALTH CARE EDUCATION/TRAINING PROGRAM

## 2025-05-27 RX ORDER — DIAPER,BRIEF,INFANT-TODD,DISP
1 EACH MISCELLANEOUS 2 TIMES DAILY
Qty: 28 G | Refills: 1 | Status: SHIPPED | OUTPATIENT
Start: 2025-05-27

## 2025-05-27 NOTE — PROGRESS NOTES
"Chief Complaint  Rash (Under left eye, feels it starting on the right, itches, tingles)    Subjective      Genny Garvey is a 44 y.o. female who presents to [facility]     History of Present Illness  The patient presents for evaluation of a rash.    Three weeks ago, she underwent an eyebrow waxing procedure. In the subsequent days, she experienced a sensation akin to residual wax on her eyebrow, prompting her to scrub the area. Despite her efforts, the discomfort persisted, and by the fifth day, she noticed irritation and swelling. She sought medical attention at an acute care facility where she was diagnosed with an eye infection. Concurrently, she observed a significant swelling in a small spot, which was attributed to the same cause. Within 24 hours of receiving treatment with a steroid and antibiotic, her condition improved. However, she reported a recurrence of the symptoms under the left lower eyelid, right lower eyelid and eyebrow yesterday, describing them as identical to the previous episode. She reports no changes in her skincare routine    She also mentions that her seasonal allergies have been exacerbated recently, leading to a nosebleed upon waking one morning, a symptom she has not previously experienced.       Patient Care Team:  Roxanne Mcwilliams MD as PCP - General (Family Medicine)  Tricia Mayo MD as Consulting Physician (Obstetrics and Gynecology)    Review of Systems      Objective   Vital Signs:   Vitals:    05/27/25 1156   BP: 142/88   BP Location: Left arm   Patient Position: Sitting   Cuff Size: Adult   Pulse: 81   Temp: 97.6 °F (36.4 °C)   TempSrc: Temporal   SpO2: 99%   Weight: 70.4 kg (155 lb 3.2 oz)   Height: 165.1 cm (65\")     Body mass index is 25.83 kg/m².    Wt Readings from Last 3 Encounters:   05/27/25 70.4 kg (155 lb 3.2 oz)   05/21/25 70.1 kg (154 lb 9.6 oz)   01/28/25 74.5 kg (164 lb 3.2 oz)     BP Readings from Last 3 Encounters: "   05/27/25 142/88   05/21/25 115/81   01/28/25 117/70       Health Maintenance   Topic Date Due    COVID-19 Vaccine (4 - 2024-25 season) 06/04/2025 (Originally 9/1/2024)    INFLUENZA VACCINE  07/01/2025    ANNUAL PHYSICAL  01/28/2026    MAMMOGRAM  12/18/2026    PAP SMEAR  12/18/2027    TDAP/TD VACCINES (3 - Td or Tdap) 07/01/2034    HEPATITIS C SCREENING  Completed    Pneumococcal Vaccine 0-49  Aged Out       Physical Exam  Constitutional:       Appearance: Normal appearance.   HENT:      Head: Normocephalic and atraumatic.        Comments: Scaly rash with mild erythema below the left lower eyelid, Gardners below the right eye and right eyebrow  Cardiovascular:      Pulses: Normal pulses.      Heart sounds: Normal heart sounds.   Pulmonary:      Effort: Pulmonary effort is normal.      Breath sounds: Normal breath sounds.   Neurological:      General: No focal deficit present.      Mental Status: She is alert and oriented to person, place, and time.   Psychiatric:         Mood and Affect: Mood normal.          Physical Exam  Skin: Erythematous, swollen patch on the eyebrow area consistent with contact dermatitis. Additional patch noted on the eyebrow.       Result Review   The following data was reviewed by: Roxanne Mcwilliams MD on 05/27/2025:  [x]  Tests & Results  []  Hospitalization/Emergency Department/Urgent Care  []  Internal/External Consultant Notes      Results         ASSESSMENT/PLAN  Diagnoses and all orders for this visit:    1. Skin rash (Primary)  -     hydrocortisone 1 % ointment; Apply 1 Application topically to the appropriate area as directed 2 (Two) Times a Day.  Dispense: 28 g; Refill: 1    2. Allergic dermatitis  -     hydrocortisone 1 % ointment; Apply 1 Application topically to the appropriate area as directed 2 (Two) Times a Day.  Dispense: 28 g; Refill: 1        Assessment & Plan  1. Contact dermatitis.  - Symptoms suggest a diagnosis of contact dermatitis  - Physical exam findings include  irritation, swelling, and a dry, itchy, and painful patch on the eyebrow area.  - A prescription for 1% hydrocortisone cream has been issued, with instructions to apply it nightly for one week. She is advised to apply a thick layer of sunscreen every morning, regardless of sun exposure, and to reapply it every four hours. If symptoms worsen or new areas are affected, she should return for further evaluation.    2. Allergic rhinitis.  - The patient reports a recent episode of severe seasonal allergies, which included a gushing nosebleed.  - She is advised to monitor her allergy symptoms and manage them accordingly, recommend humidifier in the room           FOLLOW UP  No follow-ups on file.  Patient was given instructions and counseling regarding her condition or for health maintenance advice. Please see specific information pulled into the AVS if appropriate.       Roxanne Mcwilliams MD  05/27/25  14:55 EDT    Patient or patient representative verbalized consent for the use of Ambient Listening during the visit with  Roxanne Mcwilliams MD for chart documentation. 5/27/2025  14:59 EDT

## 2025-07-05 ENCOUNTER — E-VISIT (OUTPATIENT)
Dept: ADMINISTRATIVE | Facility: OTHER | Age: 45
End: 2025-07-05
Payer: COMMERCIAL

## 2025-07-05 NOTE — E-VISIT ESCALATED
Status: Referred Out  Date: 2025 14:19:32  Acuity Level: Not applicable  Referral message: Based on the information you provided during your interview, eVisit is not appropriate for treating your condition.  Patient: [facility]  Patient : [id number]  Patient Address: [address] [place] [address]  Patient Phone: [phone]  Clinician Response: Unavailable  Diagnosis: Unavailable  Diagnosis ICD: Unavailable     Patient Interview Questions and Responses: None available

## 2025-07-09 DIAGNOSIS — F41.9 ANXIETY: Primary | ICD-10-CM

## 2025-07-09 RX ORDER — HYDROXYZINE HYDROCHLORIDE 25 MG/1
25 TABLET, FILM COATED ORAL DAILY PRN
Qty: 60 TABLET | Refills: 1 | Status: SHIPPED | OUTPATIENT
Start: 2025-07-09